# Patient Record
Sex: FEMALE
[De-identification: names, ages, dates, MRNs, and addresses within clinical notes are randomized per-mention and may not be internally consistent; named-entity substitution may affect disease eponyms.]

---

## 2024-06-28 PROBLEM — Z00.00 ENCOUNTER FOR PREVENTIVE HEALTH EXAMINATION: Status: ACTIVE | Noted: 2024-06-28

## 2024-06-29 ENCOUNTER — APPOINTMENT (OUTPATIENT)
Dept: ANTEPARTUM | Facility: CLINIC | Age: 34
End: 2024-06-29
Payer: MEDICAID

## 2024-10-16 ENCOUNTER — EMERGENCY (EMERGENCY)
Facility: HOSPITAL | Age: 34
LOS: 1 days | Discharge: HOME | End: 2024-10-16
Attending: STUDENT IN AN ORGANIZED HEALTH CARE EDUCATION/TRAINING PROGRAM | Admitting: STUDENT IN AN ORGANIZED HEALTH CARE EDUCATION/TRAINING PROGRAM
Payer: MEDICAID

## 2024-10-16 VITALS
HEIGHT: 59 IN | TEMPERATURE: 99 F | WEIGHT: 134.92 LBS | DIASTOLIC BLOOD PRESSURE: 74 MMHG | RESPIRATION RATE: 16 BRPM | HEART RATE: 125 BPM | SYSTOLIC BLOOD PRESSURE: 116 MMHG | OXYGEN SATURATION: 98 %

## 2024-10-16 VITALS
OXYGEN SATURATION: 98 % | RESPIRATION RATE: 18 BRPM | HEART RATE: 111 BPM | SYSTOLIC BLOOD PRESSURE: 100 MMHG | DIASTOLIC BLOOD PRESSURE: 60 MMHG | TEMPERATURE: 98 F

## 2024-10-16 LAB
ALBUMIN SERPL ELPH-MCNC: 2.8 G/DL — LOW (ref 3.3–5)
ALP SERPL-CCNC: 179 U/L — HIGH (ref 40–120)
ALT FLD-CCNC: 18 U/L — SIGNIFICANT CHANGE UP (ref 12–78)
ANION GAP SERPL CALC-SCNC: 13 MMOL/L — SIGNIFICANT CHANGE UP (ref 5–17)
APPEARANCE UR: ABNORMAL
AST SERPL-CCNC: 19 U/L — SIGNIFICANT CHANGE UP (ref 15–37)
BACTERIA # UR AUTO: ABNORMAL /HPF
BASOPHILS # BLD AUTO: 0.02 K/UL — SIGNIFICANT CHANGE UP (ref 0–0.2)
BASOPHILS NFR BLD AUTO: 0.2 % — SIGNIFICANT CHANGE UP (ref 0–2)
BILIRUB SERPL-MCNC: 0.2 MG/DL — SIGNIFICANT CHANGE UP (ref 0.2–1.2)
BILIRUB UR-MCNC: NEGATIVE — SIGNIFICANT CHANGE UP
BUN SERPL-MCNC: 7 MG/DL — SIGNIFICANT CHANGE UP (ref 7–23)
CALCIUM SERPL-MCNC: 9.1 MG/DL — SIGNIFICANT CHANGE UP (ref 8.5–10.1)
CHLORIDE SERPL-SCNC: 105 MMOL/L — SIGNIFICANT CHANGE UP (ref 96–108)
CO2 SERPL-SCNC: 19 MMOL/L — LOW (ref 22–31)
COLOR SPEC: YELLOW — SIGNIFICANT CHANGE UP
CREAT SERPL-MCNC: 0.53 MG/DL — SIGNIFICANT CHANGE UP (ref 0.5–1.3)
DIFF PNL FLD: NEGATIVE — SIGNIFICANT CHANGE UP
EGFR: 125 ML/MIN/1.73M2 — SIGNIFICANT CHANGE UP
EOSINOPHIL # BLD AUTO: 0.04 K/UL — SIGNIFICANT CHANGE UP (ref 0–0.5)
EOSINOPHIL NFR BLD AUTO: 0.4 % — SIGNIFICANT CHANGE UP (ref 0–6)
EPI CELLS # UR: PRESENT
GLUCOSE SERPL-MCNC: 93 MG/DL — SIGNIFICANT CHANGE UP (ref 70–99)
GLUCOSE UR QL: NEGATIVE MG/DL — SIGNIFICANT CHANGE UP
HCG SERPL-ACNC: HIGH MIU/ML
HCT VFR BLD CALC: 33.5 % — LOW (ref 34.5–45)
HGB BLD-MCNC: 11.4 G/DL — LOW (ref 11.5–15.5)
IMM GRANULOCYTES NFR BLD AUTO: 1 % — HIGH (ref 0–0.9)
KETONES UR-MCNC: NEGATIVE MG/DL — SIGNIFICANT CHANGE UP
LEUKOCYTE ESTERASE UR-ACNC: NEGATIVE — SIGNIFICANT CHANGE UP
LYMPHOCYTES # BLD AUTO: 0.68 K/UL — LOW (ref 1–3.3)
LYMPHOCYTES # BLD AUTO: 7 % — LOW (ref 13–44)
MCHC RBC-ENTMCNC: 27.1 PG — SIGNIFICANT CHANGE UP (ref 27–34)
MCHC RBC-ENTMCNC: 34 GM/DL — SIGNIFICANT CHANGE UP (ref 32–36)
MCV RBC AUTO: 79.8 FL — LOW (ref 80–100)
MONOCYTES # BLD AUTO: 0.92 K/UL — HIGH (ref 0–0.9)
MONOCYTES NFR BLD AUTO: 9.5 % — SIGNIFICANT CHANGE UP (ref 2–14)
NEUTROPHILS # BLD AUTO: 7.89 K/UL — HIGH (ref 1.8–7.4)
NEUTROPHILS NFR BLD AUTO: 81.9 % — HIGH (ref 43–77)
NITRITE UR-MCNC: NEGATIVE — SIGNIFICANT CHANGE UP
NRBC # BLD: 0 /100 WBCS — SIGNIFICANT CHANGE UP (ref 0–0)
PH UR: 7 — SIGNIFICANT CHANGE UP (ref 5–8)
PLATELET # BLD AUTO: 248 K/UL — SIGNIFICANT CHANGE UP (ref 150–400)
POTASSIUM SERPL-MCNC: 3.9 MMOL/L — SIGNIFICANT CHANGE UP (ref 3.5–5.3)
POTASSIUM SERPL-SCNC: 3.9 MMOL/L — SIGNIFICANT CHANGE UP (ref 3.5–5.3)
PROT SERPL-MCNC: 7.1 G/DL — SIGNIFICANT CHANGE UP (ref 6–8.3)
PROT UR-MCNC: NEGATIVE MG/DL — SIGNIFICANT CHANGE UP
RBC # BLD: 4.2 M/UL — SIGNIFICANT CHANGE UP (ref 3.8–5.2)
RBC # FLD: 15.3 % — HIGH (ref 10.3–14.5)
RBC CASTS # UR COMP ASSIST: 0 /HPF — SIGNIFICANT CHANGE UP (ref 0–4)
SODIUM SERPL-SCNC: 137 MMOL/L — SIGNIFICANT CHANGE UP (ref 135–145)
SP GR SPEC: 1.01 — SIGNIFICANT CHANGE UP (ref 1–1.03)
UROBILINOGEN FLD QL: 1 MG/DL — SIGNIFICANT CHANGE UP (ref 0.2–1)
WBC # BLD: 9.65 K/UL — SIGNIFICANT CHANGE UP (ref 3.8–10.5)
WBC # FLD AUTO: 9.65 K/UL — SIGNIFICANT CHANGE UP (ref 3.8–10.5)
WBC UR QL: SIGNIFICANT CHANGE UP /HPF (ref 0–5)

## 2024-10-16 PROCEDURE — 81001 URINALYSIS AUTO W/SCOPE: CPT

## 2024-10-16 PROCEDURE — 85025 COMPLETE CBC W/AUTO DIFF WBC: CPT

## 2024-10-16 PROCEDURE — 84702 CHORIONIC GONADOTROPIN TEST: CPT

## 2024-10-16 PROCEDURE — 36415 COLL VENOUS BLD VENIPUNCTURE: CPT

## 2024-10-16 PROCEDURE — 80053 COMPREHEN METABOLIC PANEL: CPT

## 2024-10-16 PROCEDURE — 99284 EMERGENCY DEPT VISIT MOD MDM: CPT | Mod: 25

## 2024-10-16 RX ORDER — SODIUM CHLORIDE 0.9 % (FLUSH) 0.9 %
1000 SYRINGE (ML) INJECTION ONCE
Refills: 0 | Status: COMPLETED | OUTPATIENT
Start: 2024-10-16 | End: 2024-10-16

## 2024-10-16 RX ORDER — ACETAMINOPHEN 325 MG
650 TABLET ORAL ONCE
Refills: 0 | Status: COMPLETED | OUTPATIENT
Start: 2024-10-16 | End: 2024-10-16

## 2024-10-16 RX ADMIN — Medication 650 MILLIGRAM(S): at 04:40

## 2024-10-16 RX ADMIN — Medication 1000 MILLILITER(S): at 04:40

## 2024-10-16 NOTE — ED ADULT TRIAGE NOTE - CCCP TRG CHIEF CMPLNT
No care due was identified.  [facility] Embedded Care Due Messages. Reference number: [id number].   1/24/2024 1:57:13 PM CST   fever LMP 2/3/24/fever

## 2024-10-16 NOTE — ED PROVIDER NOTE - OBJECTIVE STATEMENT
34-year-old female G3, P2 currently 36 weeks pregnant, LMP 2/3/24, presents with fever that started last night.  Patient went to her OB/GYN yesterday for a routine checkup.  She had a normal ultrasound and a normal urinalysis.  She received a vaccination in the office yesterday for flu, COVID, and RSV.  It was a total of 4 injections, 2 in each arm.  At 11 PM last night she started to feel chills and at 1:40am, she had a temperature of 101.1.  She took no medication for the fever prior to arrival.  She denies any associated abdominal pain, vaginal bleeding, back pain, dysuria, hematuria, cough, chest pain, shortness of breath. Ob/ Gyn Dr. Swain 34-year-old female  currently 36 weeks pregnant, LMP 2/3/24, presents with fever that started last night.  Patient went to her OB/GYN yesterday for a routine checkup.  She had a normal ultrasound and a normal urinalysis.  She received a vaccination in the office yesterday for flu, COVID, and RSV.  It was a total of 4 injections, 2 in each arm.  At 11 PM last night she started to feel chills and at 1:40am, she had a temperature of 101.1.  She took no medication for the fever prior to arrival.  She denies any associated abdominal pain, vaginal bleeding, back pain, dysuria, hematuria, cough, chest pain, shortness of breath. Ob/ Gyn Dr. Swain

## 2024-10-16 NOTE — ED PROVIDER NOTE - NSFOLLOWUPINSTRUCTIONS_ED_ALL_ED_FT
Please take Tylenol for fever and follow up with your Ob/Gyn.  Return to the ER for persistent fever, abdominal pain, vaginal bleeding, back pain, burning urination, cough, chest pain, shortness of breath, or any other concerns.     Fever, Adult        A fever is an increase in the body's temperature. It is usually defined as a temperature of 100.4°F (38°C) or higher. Brief mild or moderate fevers generally have no long-term effects, and they often do not need treatment. Moderate or high fevers may make you feel uncomfortable and can sometimes be a sign of a serious illness or disease. The sweating that may occur with repeated or prolonged fever may also cause a loss of fluid in the body (dehydration).    Fever is confirmed by taking a temperature with a thermometer. A measured temperature can vary with:    Age.  Time of day.  Where in the body you take the temperature. Readings may vary if you place the thermometer:    In the mouth (oral).  In the rectum (rectal).  In the ear (tympanic).  Under the arm (axillary).  On the forehead (temporal).    Follow these instructions at home:      Medicines    Take over-the counter and prescription medicines only as told by your health care provider. Follow the dosing instructions carefully.  If you were prescribed an antibiotic medicine, take it as told by your health care provider. Do not stop taking the antibiotic even if you start to feel better.        General instructions    Watch your condition for any changes. Let your health care provider know about them.  Rest as needed.  Drink enough fluid to keep your urine pale yellow. This helps to prevent dehydration.  Sponge yourself or bathe with room-temperature water to help reduce your body temperature as needed. Do not use ice water.  Do not use too many blankets or wear clothes that are too heavy.  If your fever may be caused by an infection that spreads from person to person (is contagious), such as a cold or the flu, you should stay home from work and public gatherings for at least 24 hours after your fever is gone. Your fever should be gone without the need to use medicines.    Contact a health care provider if:  You vomit.  You cannot eat or drink without vomiting.  You have diarrhea.  You have pain when you urinate.  Your symptoms do not improve with treatment.  You develop new symptoms.  You develop excessive weakness.    Get help right away if:  You have shortness of breath or have trouble breathing.  You are dizzy or you faint.  You are disoriented or confused.  You develop signs of dehydration, such as:    Dark urine, very little urine, or no urine.  Cracked lips.  Dry mouth.  Sunken eyes.  Sleepiness.  Weakness.  You develop severe pain in your abdomen.  You have persistent vomiting or diarrhea.  You develop a skin rash.  Your symptoms suddenly get worse.    Summary  A fever is an increase in the body's temperature. It is usually defined as a temperature of 100.4°F (38°C) or higher. Moderate or high fevers can sometimes be a sign of a serious illness or disease. The sweating that may occur with repeated or prolonged fever may also cause dehydration.  Pay attention to any changes in your symptoms and contact your health care provider if your symptoms do not improve with treatment.  Take over-the counter and prescription medicines only as told by your health care provider. Follow the dosing instructions carefully.  If your fever is from an infection that may be contagious, such as cold or flu, you should stay home from work and public gatherings for at least 24 hours after your fever is gone. Your fever should be gone without the need to use medicines.  Get help right away if you develop signs of dehydration, such as dark urine, cracked lips, dry mouth, sunken eyes, sleepiness, or weakness.    ADDITIONAL NOTES AND INSTRUCTIONS    Please follow up with your Primary MD in 24-48 hr.  Seek immediate medical care for any new/worsening signs or symptoms.

## 2024-10-16 NOTE — ED ADULT NURSE NOTE - OBJECTIVE STATEMENT
pt a&ox4 with complaints of fever since 11pm last night after having flu/covid/rsv vaccines yesterday 36 wk 3d pregnant. has low grade temp 99.4 with body aches and soreness to upper arms where she had shots, worse right arm. denies any abdominal cramping/ vag bleeding

## 2024-10-16 NOTE — ED PROVIDER NOTE - NSICDXPASTSURGICALHX_GEN_ALL_CORE_FT
PAST SURGICAL HISTORY:  No significant past surgical history Prednisone Pregnancy And Lactation Text: This medication is Pregnancy Category C and it isn't know if it is safe during pregnancy. This medication is excreted in breast milk.

## 2024-10-16 NOTE — ED PROVIDER NOTE - CARE PROVIDERS DIRECT ADDRESSES
,DirectAddress_Unknown,[email or web address]

## 2024-10-16 NOTE — ED PROVIDER NOTE - PATIENT PORTAL LINK FT
You can access the FollowMyHealth Patient Portal offered by [facility] by registering at the following website: [email or web address]. By joining [employer]’s FollowMyHealth portal, you will also be able to view your health information using other applications (apps) compatible with our system.

## 2024-10-16 NOTE — ED PROVIDER NOTE - CARE PROVIDER_API CALL
Dr. Swain,   Phone: (   )    -  Fax: (   )    -  Follow Up Time:     Cruz Dawn  Obstetrics and Gynecology  [address] [place] [id number]  Phone: [phone]  Fax: [phone]  Follow Up Time:

## 2024-10-16 NOTE — ED PROVIDER NOTE - DIFFERENTIAL DIAGNOSIS
Differential Diagnosis Ddx includes but not limited to post-vaccination fever, UTI, PNA, viral illness

## 2024-10-16 NOTE — ED PROVIDER NOTE - PROGRESS NOTE DETAILS
HR improved to 108, afebrile.  Results of work up discussed, copies of all reports given.  Patient will f/u with Ob/Gyn.  Advised Tylenol.  Return precautions discussed.  Patient expressed understanding of discharge instructions. HR improved to 108, afebrile.  Results of work up discussed, copies of all reports given.  Patient offered fetal US but she is declining since she had normal US yesterday. Patient will f/u with Ob/Gyn.  Advised Tylenol.  Return precautions discussed.  Patient expressed understanding of discharge instructions.

## 2024-10-16 NOTE — ED PROVIDER NOTE - CLINICAL SUMMARY MEDICAL DECISION MAKING FREE TEXT BOX
34 year old female  currently 36 weeks pregnant presents with fever that started last night.  Yesterday she had a routine check up with her Ob/Gyn. She had a normal UA and US.  She received a vaccination for flu/covid/rsv- 4 injections total.  Her temp was 101.1 early this morning, no medication taken PTA.  She denies abdominal pain, dysuria, hematuria, vaginal bleeding, cough.  Low-grade temp with  in ED. Check labs, UA, Ucx, hydrate, Tylenol, reassess

## 2024-10-25 PROBLEM — Z78.9 OTHER SPECIFIED HEALTH STATUS: Chronic | Status: ACTIVE | Noted: 2024-10-16

## 2024-10-30 ENCOUNTER — OUTPATIENT (OUTPATIENT)
Dept: OUTPATIENT SERVICES | Facility: HOSPITAL | Age: 34
LOS: 1 days | End: 2024-10-30

## 2024-10-30 VITALS
HEIGHT: 58.5 IN | OXYGEN SATURATION: 98 % | TEMPERATURE: 98 F | DIASTOLIC BLOOD PRESSURE: 75 MMHG | WEIGHT: 130.95 LBS | SYSTOLIC BLOOD PRESSURE: 112 MMHG | HEART RATE: 97 BPM | RESPIRATION RATE: 16 BRPM

## 2024-10-30 DIAGNOSIS — Z98.891 HISTORY OF UTERINE SCAR FROM PREVIOUS SURGERY: Chronic | ICD-10-CM

## 2024-10-30 DIAGNOSIS — Z98.891 HISTORY OF UTERINE SCAR FROM PREVIOUS SURGERY: ICD-10-CM

## 2024-10-30 DIAGNOSIS — Z98.890 OTHER SPECIFIED POSTPROCEDURAL STATES: Chronic | ICD-10-CM

## 2024-10-30 PROBLEM — Z78.9 OTHER SPECIFIED HEALTH STATUS: Chronic | Status: INACTIVE | Noted: 2024-10-16 | Resolved: 2024-10-30

## 2024-10-30 LAB
ALBUMIN SERPL ELPH-MCNC: 3.6 G/DL — SIGNIFICANT CHANGE UP (ref 3.3–5)
ALP SERPL-CCNC: 194 U/L — HIGH (ref 40–120)
ALT FLD-CCNC: 11 U/L — SIGNIFICANT CHANGE UP (ref 10–45)
ANION GAP SERPL CALC-SCNC: 17 MMOL/L — SIGNIFICANT CHANGE UP (ref 5–17)
APPEARANCE UR: CLEAR — SIGNIFICANT CHANGE UP
AST SERPL-CCNC: 13 U/L — SIGNIFICANT CHANGE UP (ref 10–40)
BASOPHILS # BLD AUTO: 0.02 K/UL — SIGNIFICANT CHANGE UP (ref 0–0.2)
BASOPHILS NFR BLD AUTO: 0.3 % — SIGNIFICANT CHANGE UP (ref 0–2)
BILIRUB SERPL-MCNC: <0.2 MG/DL — SIGNIFICANT CHANGE UP (ref 0.2–1.2)
BILIRUB UR-MCNC: NEGATIVE — SIGNIFICANT CHANGE UP
BLD GP AB SCN SERPL QL: NEGATIVE — SIGNIFICANT CHANGE UP
BUN SERPL-MCNC: 10 MG/DL — SIGNIFICANT CHANGE UP (ref 7–23)
CALCIUM SERPL-MCNC: 9.2 MG/DL — SIGNIFICANT CHANGE UP (ref 8.4–10.5)
CHLORIDE SERPL-SCNC: 102 MMOL/L — SIGNIFICANT CHANGE UP (ref 96–108)
CO2 SERPL-SCNC: 17 MMOL/L — LOW (ref 22–31)
COLOR SPEC: YELLOW — SIGNIFICANT CHANGE UP
CREAT SERPL-MCNC: 0.53 MG/DL — SIGNIFICANT CHANGE UP (ref 0.5–1.3)
CRP SERPL-MCNC: 5 MG/L — HIGH
DIFF PNL FLD: NEGATIVE — SIGNIFICANT CHANGE UP
EGFR: 125 ML/MIN/1.73M2 — SIGNIFICANT CHANGE UP
EOSINOPHIL # BLD AUTO: 0.06 K/UL — SIGNIFICANT CHANGE UP (ref 0–0.5)
EOSINOPHIL NFR BLD AUTO: 0.8 % — SIGNIFICANT CHANGE UP (ref 0–6)
FERRITIN SERPL-MCNC: 23 NG/ML — SIGNIFICANT CHANGE UP (ref 15–150)
FOLATE SERPL-MCNC: >20 NG/ML — SIGNIFICANT CHANGE UP
GLUCOSE SERPL-MCNC: 80 MG/DL — SIGNIFICANT CHANGE UP (ref 70–99)
GLUCOSE UR QL: NEGATIVE MG/DL — SIGNIFICANT CHANGE UP
HCT VFR BLD CALC: 33.8 % — LOW (ref 34.5–45)
HGB BLD-MCNC: 11.3 G/DL — LOW (ref 11.5–15.5)
IMM GRANULOCYTES NFR BLD AUTO: 0.5 % — SIGNIFICANT CHANGE UP (ref 0–0.9)
IRON SATN MFR SERPL: 16 % — SIGNIFICANT CHANGE UP (ref 14–50)
IRON SATN MFR SERPL: 79 UG/DL — SIGNIFICANT CHANGE UP (ref 30–160)
KETONES UR-MCNC: NEGATIVE MG/DL — SIGNIFICANT CHANGE UP
LEUKOCYTE ESTERASE UR-ACNC: NEGATIVE — SIGNIFICANT CHANGE UP
LYMPHOCYTES # BLD AUTO: 1.77 K/UL — SIGNIFICANT CHANGE UP (ref 1–3.3)
LYMPHOCYTES # BLD AUTO: 23.9 % — SIGNIFICANT CHANGE UP (ref 13–44)
MCHC RBC-ENTMCNC: 27 PG — SIGNIFICANT CHANGE UP (ref 27–34)
MCHC RBC-ENTMCNC: 33.4 G/DL — SIGNIFICANT CHANGE UP (ref 32–36)
MCV RBC AUTO: 80.9 FL — SIGNIFICANT CHANGE UP (ref 80–100)
MONOCYTES # BLD AUTO: 0.69 K/UL — SIGNIFICANT CHANGE UP (ref 0–0.9)
MONOCYTES NFR BLD AUTO: 9.3 % — SIGNIFICANT CHANGE UP (ref 2–14)
NEUTROPHILS # BLD AUTO: 4.84 K/UL — SIGNIFICANT CHANGE UP (ref 1.8–7.4)
NEUTROPHILS NFR BLD AUTO: 65.2 % — SIGNIFICANT CHANGE UP (ref 43–77)
NITRITE UR-MCNC: NEGATIVE — SIGNIFICANT CHANGE UP
PH UR: 7 — SIGNIFICANT CHANGE UP (ref 5–8)
PLATELET # BLD AUTO: 277 K/UL — SIGNIFICANT CHANGE UP (ref 150–400)
POTASSIUM SERPL-MCNC: 4.3 MMOL/L — SIGNIFICANT CHANGE UP (ref 3.5–5.3)
POTASSIUM SERPL-SCNC: 4.3 MMOL/L — SIGNIFICANT CHANGE UP (ref 3.5–5.3)
PROT SERPL-MCNC: 6.3 G/DL — SIGNIFICANT CHANGE UP (ref 6–8.3)
PROT UR-MCNC: NEGATIVE MG/DL — SIGNIFICANT CHANGE UP
RBC # BLD: 4.18 M/UL — SIGNIFICANT CHANGE UP (ref 3.8–5.2)
RBC # FLD: 16 % — HIGH (ref 10.3–14.5)
RETICS #: 102.7 K/UL — SIGNIFICANT CHANGE UP (ref 25–125)
RETICS/RBC NFR: 2.5 % — SIGNIFICANT CHANGE UP (ref 0.5–2.5)
RH IG SCN BLD-IMP: POSITIVE — SIGNIFICANT CHANGE UP
SODIUM SERPL-SCNC: 136 MMOL/L — SIGNIFICANT CHANGE UP (ref 135–145)
SP GR SPEC: 1.01 — SIGNIFICANT CHANGE UP (ref 1–1.03)
TIBC SERPL-MCNC: 500 UG/DL — HIGH (ref 220–430)
UIBC SERPL-MCNC: 422 UG/DL — HIGH (ref 110–370)
UROBILINOGEN FLD QL: 0.2 MG/DL — SIGNIFICANT CHANGE UP (ref 0.2–1)
VIT B12 SERPL-MCNC: 303 PG/ML — SIGNIFICANT CHANGE UP (ref 232–1245)
WBC # BLD: 7.42 K/UL — SIGNIFICANT CHANGE UP (ref 3.8–10.5)
WBC # FLD AUTO: 7.42 K/UL — SIGNIFICANT CHANGE UP (ref 3.8–10.5)

## 2024-10-30 NOTE — OB PST NOTE - PROBLEM SELECTOR PLAN 1
Pt. is scheduled for a repeat  section 24.  Pt. verbalized understanding of instructions and that Chlorhexidine is for external use.  PST CBC, UA, and T&S drawn.

## 2024-10-30 NOTE — OB PST NOTE - TEACHING/LEARNING LEARNING PREFERENCES
Blood count is just a little above normal but does not indicate infection.   NO anemia  Liver, kidneys and sugar are good written material

## 2024-11-05 ENCOUNTER — TRANSCRIPTION ENCOUNTER (OUTPATIENT)
Age: 34
End: 2024-11-05

## 2024-11-05 ENCOUNTER — ASOB RESULT (OUTPATIENT)
Age: 34
End: 2024-11-05

## 2024-11-05 ENCOUNTER — INPATIENT (INPATIENT)
Facility: HOSPITAL | Age: 34
LOS: 1 days | Discharge: HOME | End: 2024-11-07
Attending: OBSTETRICS & GYNECOLOGY | Admitting: OBSTETRICS & GYNECOLOGY

## 2024-11-05 ENCOUNTER — APPOINTMENT (OUTPATIENT)
Dept: ANTEPARTUM | Facility: CLINIC | Age: 34
End: 2024-11-05
Payer: MEDICAID

## 2024-11-05 VITALS
HEART RATE: 86 BPM | SYSTOLIC BLOOD PRESSURE: 127 MMHG | RESPIRATION RATE: 15 BRPM | TEMPERATURE: 99 F | DIASTOLIC BLOOD PRESSURE: 74 MMHG

## 2024-11-05 DIAGNOSIS — O26.899 OTHER SPECIFIED PREGNANCY RELATED CONDITIONS, UNSPECIFIED TRIMESTER: ICD-10-CM

## 2024-11-05 DIAGNOSIS — Z98.891 HISTORY OF UTERINE SCAR FROM PREVIOUS SURGERY: Chronic | ICD-10-CM

## 2024-11-05 DIAGNOSIS — Z98.890 OTHER SPECIFIED POSTPROCEDURAL STATES: Chronic | ICD-10-CM

## 2024-11-05 LAB
BASOPHILS # BLD AUTO: 0.01 K/UL — SIGNIFICANT CHANGE UP (ref 0–0.2)
BASOPHILS NFR BLD AUTO: 0.1 % — SIGNIFICANT CHANGE UP (ref 0–2)
BLD GP AB SCN SERPL QL: NEGATIVE — SIGNIFICANT CHANGE UP
EOSINOPHIL # BLD AUTO: 0.05 K/UL — SIGNIFICANT CHANGE UP (ref 0–0.5)
EOSINOPHIL NFR BLD AUTO: 0.6 % — SIGNIFICANT CHANGE UP (ref 0–6)
HCT VFR BLD CALC: 35.9 % — SIGNIFICANT CHANGE UP (ref 34.5–45)
HGB BLD-MCNC: 11.9 G/DL — SIGNIFICANT CHANGE UP (ref 11.5–15.5)
IANC: 4.74 K/UL — SIGNIFICANT CHANGE UP (ref 1.8–7.4)
IMM GRANULOCYTES NFR BLD AUTO: 0.5 % — SIGNIFICANT CHANGE UP (ref 0–0.9)
LYMPHOCYTES # BLD AUTO: 2.4 K/UL — SIGNIFICANT CHANGE UP (ref 1–3.3)
LYMPHOCYTES # BLD AUTO: 30.5 % — SIGNIFICANT CHANGE UP (ref 13–44)
MCHC RBC-ENTMCNC: 27 PG — SIGNIFICANT CHANGE UP (ref 27–34)
MCHC RBC-ENTMCNC: 33.1 G/DL — SIGNIFICANT CHANGE UP (ref 32–36)
MCV RBC AUTO: 81.4 FL — SIGNIFICANT CHANGE UP (ref 80–100)
MONOCYTES # BLD AUTO: 0.62 K/UL — SIGNIFICANT CHANGE UP (ref 0–0.9)
MONOCYTES NFR BLD AUTO: 7.9 % — SIGNIFICANT CHANGE UP (ref 2–14)
NEUTROPHILS # BLD AUTO: 4.74 K/UL — SIGNIFICANT CHANGE UP (ref 1.8–7.4)
NEUTROPHILS NFR BLD AUTO: 60.4 % — SIGNIFICANT CHANGE UP (ref 43–77)
NRBC # BLD: 0 /100 WBCS — SIGNIFICANT CHANGE UP (ref 0–0)
NRBC # FLD: 0 K/UL — SIGNIFICANT CHANGE UP (ref 0–0)
PLATELET # BLD AUTO: 272 K/UL — SIGNIFICANT CHANGE UP (ref 150–400)
RBC # BLD: 4.41 M/UL — SIGNIFICANT CHANGE UP (ref 3.8–5.2)
RBC # FLD: 15.6 % — HIGH (ref 10.3–14.5)
RH IG SCN BLD-IMP: POSITIVE — SIGNIFICANT CHANGE UP
WBC # BLD: 7.86 K/UL — SIGNIFICANT CHANGE UP (ref 3.8–10.5)
WBC # FLD AUTO: 7.86 K/UL — SIGNIFICANT CHANGE UP (ref 3.8–10.5)

## 2024-11-05 PROCEDURE — 76815 OB US LIMITED FETUS(S): CPT | Mod: 26

## 2024-11-05 DEVICE — SURGICEL SNOW 2 X 4": Type: IMPLANTABLE DEVICE | Status: FUNCTIONAL

## 2024-11-05 RX ORDER — SODIUM CHLORIDE 9 MG/ML
3 INJECTION, SOLUTION INTRAMUSCULAR; INTRAVENOUS; SUBCUTANEOUS EVERY 8 HOURS
Refills: 0 | Status: DISCONTINUED | OUTPATIENT
Start: 2024-11-05 | End: 2024-11-07

## 2024-11-05 RX ORDER — CHLORHEXIDINE GLUCONATE 40 MG/ML
1 SOLUTION TOPICAL DAILY
Refills: 0 | Status: DISCONTINUED | OUTPATIENT
Start: 2024-11-05 | End: 2024-11-05

## 2024-11-05 RX ORDER — CITRIC ACID/SODIUM CITRATE 334-500MG
30 SOLUTION, ORAL ORAL ONCE
Refills: 0 | Status: COMPLETED | OUTPATIENT
Start: 2024-11-05 | End: 2024-11-05

## 2024-11-05 RX ORDER — OXYCODONE HYDROCHLORIDE 30 MG/1
5 TABLET ORAL ONCE
Refills: 0 | Status: DISCONTINUED | OUTPATIENT
Start: 2024-11-05 | End: 2024-11-07

## 2024-11-05 RX ORDER — ACETAMINOPHEN 500 MG
975 TABLET ORAL
Refills: 0 | Status: DISCONTINUED | OUTPATIENT
Start: 2024-11-05 | End: 2024-11-07

## 2024-11-05 RX ORDER — FAMOTIDINE 10 MG/ML
20 INJECTION INTRAVENOUS ONCE
Refills: 0 | Status: COMPLETED | OUTPATIENT
Start: 2024-11-05 | End: 2024-11-05

## 2024-11-05 RX ORDER — OXYTOCIN IN D5W-0.2% SODIUM CL 15/250 ML
42 PLASTIC BAG, INJECTION (ML) INTRAVENOUS
Qty: 30 | Refills: 0 | Status: DISCONTINUED | OUTPATIENT
Start: 2024-11-05 | End: 2024-11-06

## 2024-11-05 RX ORDER — SIMETHICONE 80 MG/1
80 TABLET, CHEWABLE ORAL EVERY 4 HOURS
Refills: 0 | Status: DISCONTINUED | OUTPATIENT
Start: 2024-11-05 | End: 2024-11-07

## 2024-11-05 RX ORDER — IBUPROFEN 200 MG
600 TABLET ORAL EVERY 6 HOURS
Refills: 0 | Status: COMPLETED | OUTPATIENT
Start: 2024-11-05 | End: 2025-10-04

## 2024-11-05 RX ORDER — KETOROLAC TROMETHAMINE 30 MG/ML
30 INJECTION INTRAMUSCULAR; INTRAVENOUS EVERY 6 HOURS
Refills: 0 | Status: DISCONTINUED | OUTPATIENT
Start: 2024-11-05 | End: 2024-11-06

## 2024-11-05 RX ORDER — HEPARIN SODIUM 10000 [USP'U]/ML
5000 INJECTION INTRAVENOUS; SUBCUTANEOUS EVERY 12 HOURS
Refills: 0 | Status: DISCONTINUED | OUTPATIENT
Start: 2024-11-05 | End: 2024-11-07

## 2024-11-05 RX ORDER — MODIFIED LANOLIN
1 OINTMENT (GRAM) TOPICAL EVERY 6 HOURS
Refills: 0 | Status: DISCONTINUED | OUTPATIENT
Start: 2024-11-05 | End: 2024-11-07

## 2024-11-05 RX ORDER — DIPHENHYDRAMINE HCL 12.5MG/5ML
25 ELIXIR ORAL EVERY 6 HOURS
Refills: 0 | Status: DISCONTINUED | OUTPATIENT
Start: 2024-11-05 | End: 2024-11-07

## 2024-11-05 RX ORDER — ACETAMINOPHEN 500 MG
1000 TABLET ORAL ONCE
Refills: 0 | Status: COMPLETED | OUTPATIENT
Start: 2024-11-05 | End: 2024-11-05

## 2024-11-05 RX ORDER — OXYCODONE HYDROCHLORIDE 30 MG/1
5 TABLET ORAL
Refills: 0 | Status: DISCONTINUED | OUTPATIENT
Start: 2024-11-05 | End: 2024-11-07

## 2024-11-05 RX ORDER — CLOSTRIDIUM TETANI TOXOID ANTIGEN (FORMALDEHYDE INACTIVATED), CORYNEBACTERIUM DIPHTHERIAE TOXOID ANTIGEN (FORMALDEHYDE INACTIVATED), BORDETELLA PERTUSSIS TOXOID ANTIGEN (GLUTARALDEHYDE INACTIVATED), BORDETELLA PERTUSSIS FILAMENTOUS HEMAGGLUTININ ANTIGEN (FORMALDEHYDE INACTIVATED), BORDETELLA PERTUSSIS PERTACTIN ANTIGEN, AND BORDETELLA PERTUSSIS FIMBRIAE 2/3 ANTIGEN 5; 2; 2.5; 5; 3; 5 [LF]/.5ML; [LF]/.5ML; UG/.5ML; UG/.5ML; UG/.5ML; UG/.5ML
0.5 INJECTION, SUSPENSION INTRAMUSCULAR ONCE
Refills: 0 | Status: DISCONTINUED | OUTPATIENT
Start: 2024-11-05 | End: 2024-11-07

## 2024-11-05 RX ORDER — MAGNESIUM HYDROXIDE 1200 MG/15ML
30 SUSPENSION ORAL
Refills: 0 | Status: DISCONTINUED | OUTPATIENT
Start: 2024-11-05 | End: 2024-11-07

## 2024-11-05 RX ADMIN — Medication 1000 MILLIGRAM(S): at 20:00

## 2024-11-05 RX ADMIN — KETOROLAC TROMETHAMINE 30 MILLIGRAM(S): 30 INJECTION INTRAMUSCULAR; INTRAVENOUS at 22:00

## 2024-11-05 RX ADMIN — SIMETHICONE 80 MILLIGRAM(S): 80 TABLET, CHEWABLE ORAL at 21:32

## 2024-11-05 RX ADMIN — Medication 975 MILLIGRAM(S): at 23:00

## 2024-11-05 RX ADMIN — Medication 200 MILLILITER(S): at 12:53

## 2024-11-05 RX ADMIN — FAMOTIDINE 20 MILLIGRAM(S): 10 INJECTION INTRAVENOUS at 12:55

## 2024-11-05 RX ADMIN — Medication 975 MILLIGRAM(S): at 23:45

## 2024-11-05 RX ADMIN — CHLORHEXIDINE GLUCONATE 1 APPLICATION(S): 40 SOLUTION TOPICAL at 12:00

## 2024-11-05 RX ADMIN — SODIUM CHLORIDE 3 MILLILITER(S): 9 INJECTION, SOLUTION INTRAMUSCULAR; INTRAVENOUS; SUBCUTANEOUS at 22:05

## 2024-11-05 RX ADMIN — Medication 400 MILLIGRAM(S): at 18:06

## 2024-11-05 RX ADMIN — Medication 30 MILLILITER(S): at 13:00

## 2024-11-05 RX ADMIN — HEPARIN SODIUM 5000 UNIT(S): 10000 INJECTION INTRAVENOUS; SUBCUTANEOUS at 22:04

## 2024-11-05 RX ADMIN — KETOROLAC TROMETHAMINE 30 MILLIGRAM(S): 30 INJECTION INTRAMUSCULAR; INTRAVENOUS at 22:30

## 2024-11-05 NOTE — OB PROVIDER DELIVERY SUMMARY - NSPROVIDERDELIVERYNOTE_OBGYN_ALL_OB_FT
rMTCS     Viable female infant.  cephalic presentation. 3610g . APGARS 9/9  Grossly normal uterus, bilateral tubes, and ovaries  Dense adhesions between peritoneum, rectus muscle, bladder and lower uterine segment  Hysterotomy closed in x1 layer w/ 0 vicryl  Bladder retrofilled with 200ccs of methylene blue, serosa intact, no extravasation appreciated  Surgicel powder placed atop hysterotomy site  Fascia reapproximated w/ 0 Vicryl in a running fashion   SubQ reapproximated w/ 2-0 plain gut in a running fashion   Skin reapproximated w/ 3-0 Monocryl in a subcuticular fashion   Pt to recovery in stable condition    [id number]/1400/150    Dictation #: [id number]

## 2024-11-05 NOTE — DISCHARGE NOTE OB - FINANCIAL ASSISTANCE
[facility] provides services at a reduced cost to those who are determined to be eligible through [facility]’s financial assistance program. Information regarding [facility]’s financial assistance program can be found by going to [email or web address] or by calling [phone].

## 2024-11-05 NOTE — DISCHARGE NOTE OB - MATERIALS PROVIDED
Vaccinations/[facility] [place] Screening Program/[place]  Immunization Record/Breastfeeding Log/Breastfeeding Mother’s Support Group Information/Guide to Postpartum Care/[facility] Hearing Screen Program/Back To Sleep Handout/Shaken Baby Prevention Handout/Breastfeeding Guide and Packet/Birth Certificate Instructions/Discharge Medication Information for Patients and Families Pocket Guide/MMR Vaccination (VIS Pub Date: 2012)/Tdap Vaccination (VIS Pub Date: 2012)

## 2024-11-05 NOTE — DISCHARGE NOTE OB - PATIENT PORTAL LINK FT
You can access the FollowMyHealth Patient Portal offered by [facility] by registering at the following website: [email or web address]. By joining [employer]’s FollowMyHealth portal, you will also be able to view your health information using other applications (apps) compatible with our system.

## 2024-11-05 NOTE — OB PROVIDER H&P - HISTORY OF PRESENT ILLNESS
PNC with [facility]   33 y/o  @ 39.2 wks gestation presents from [facility] for prolonged monitoring secondary to a NRNST in the office pt reports having irregular uterine contractions and states had VE in office and was 1 cm denies any VB or LOF reports +FM denies any n/v/d denies any fever or chills ap care comp by :  scheduled repeat  2024  anemia - FeSO4 1 tab qd

## 2024-11-05 NOTE — OB NEONATOLOGY/PEDIATRICIAN DELIVERY SUMMARY - BABY A: STOOL IN DELIVERY
[facility]  PREOPERATIVE INSTRUCTIONS    Surgery Date:   12/22/2017 Friday   Surgery arrival time given by surgeon: YES - was told by office to be here at [address]  ([facility] staff will call you between 3pm - 7pm the day before surgery with your arrival time. If your surgery is on a Monday, we will call you the preceding Friday. Please call [phone] after 7pm if you did not receive your arrival time.)  1. Report  to the 2nd Floor Admitting Desk on the day of your surgery. Bring your insurance card, photo identification, and any copayment (if applicable). 2. You must have a responsible adult to drive you home and stay with you the first 24 hours after surgery if you are going home the same day of your surgery. 3. Nothing to eat or drink after midnight the night before surgery. This means NO water, gum, mints, coffee, juice, etc.    4. MEDICATIONS TO TAKE THE MORNING OF SURGERY WITH A SIP OF WATER:TAKE your amlodipine, ditropan, omeprazole. You may use your eye drops and flonase if needed. Stop your probiotic and vitamins 7 days before surgery. 5. No alcoholic beverages 24 hours before and after your surgery. 6. If you are being admitted to the hospital,please leave personal belongings/luggage in your car until you have an assigned hospital room number. ( The hospital discharge time is 12 PM NOON. Your adult  should be at the hospital prior to the noon discharge time unless otherwise instructed.)   7. STOP Aspirin and/or any non-steroidal anti-inflammatory drugs (i.e. Ibuprofen, Naproxen, Advil, Aleve) as directed by your surgeon. You may take Tylenol. Stop herbal supplements 1 week prior to  surgery. 8. If you are currently taking Plavix, Coumadin,or any other blood-thinning/ anticoagulant medication contact your surgeon for instructions. 9. Wear comfortable clothes. Wear your glasses instead of contacts. Please leave all money, jewelry and valuables at home.  No make up, particularly mascara, the day of surgery. 10.  REMOVE ALL body piercings, rings,and jewelry and leave at home. Wear your hair loose or down, no pony-tails, buns, or any metal hair clips. 11. If you shower the morning of surgery, please do not apply any lotions, powders, or deodorants afterwards. Do not shave any body area within 24 hours of your surgery. 12. Please follow all instructions to avoid any potential surgical cancellation. 13. Should your physical condition change, (i.e. fever, cold, flu, etc.) please notify your surgeon as soon as possible. 14. It is important to be on time. If a situation occurs where you may be delayed, please call:  [phone] / [phone] on the day of surgery. 15. The Preadmission Testing staff can be reached at [phone]. 16. Special instructions: Free  parking 7am-5pm. Follow diet and bowel prep instructions per Dr. Junito Madrigal. 17. The patient was contacted  in person. She  verbalize  understanding of all instructions does not  need reinforcement. no

## 2024-11-05 NOTE — DISCHARGE NOTE OB - CARE PLAN
Principal Discharge DX:	Status post repeat low transverse  section  Assessment and plan of treatment:	term pregnancy now delivered via rpt c/s  stable, uncomplicated  healthy baby girl on 24   1

## 2024-11-05 NOTE — OB RN PATIENT PROFILE - FUNCTIONAL ASSESSMENT - DAILY ACTIVITY 2.
For information on Fall & Injury Prevention, visit: [email or web address] OR  [email or web address] OR  https://www.cdc.gov/steadi/patient.html 4 = No assist / stand by assistance

## 2024-11-05 NOTE — DISCHARGE NOTE OB - HOSPITAL COURSE
Tertiary  section for viable female fetus on 24   Uncomplicated, patient tolerated well  Healthy baby girl 7lb 15oz on 24

## 2024-11-05 NOTE — DISCHARGE NOTE OB - MEDICATION SUMMARY - MEDICATIONS TO TAKE
I will START or STAY ON the medications listed below when I get home from the hospital:    ibuprofen 600 mg oral tablet  -- 1 tab(s) by mouth every 6 hours, As Needed  -- Indication: For pain    Prenatal Multivitamins with Folic Acid 1 mg oral tablet  -- 1 tab(s) by mouth once a day  -- Indication: For continued need    ferrous sulfate 325 mg (65 mg elemental iron) oral tablet  -- 1 tab(s) by mouth 3 times a day  -- Indication: For anemia   I will START or STAY ON the medications listed below when I get home from the hospital:    ibuprofen 600 mg oral tablet  -- 1 tab(s) by mouth every 6 hours as needed for  moderate pain  -- Indication: For pain    acetaminophen 325 mg oral tablet  -- 3 tab(s) by mouth every 6 hours as needed for  moderate pain  -- Indication: For pain    ferrous sulfate 325 mg (65 mg elemental iron) oral tablet  -- 1 tab(s) by mouth 3 times a day  -- Indication: For anemia    ascorbic acid 500 mg oral tablet  -- 1 tab(s) by mouth once a day  -- Indication: For nutrition

## 2024-11-05 NOTE — OB RN INTRAOPERATIVE NOTE - NSSELHIDDEN_OBGYN_ALL_OB_FT
[NS_DeliveryAttending1_OBGYN_ALL_OB_FT:[id number]],[NS_DeliveryRN_OBGYN_ALL_OB_FT:[id number]]

## 2024-11-05 NOTE — OB RN PATIENT PROFILE - SPIRITUAL CULTURAL, CURRENT SITUATION, PROFILE
Presents to ED with complaints of right upper thigh. Pt reports in January he felt he pulled a muscle. He reports the pain has been ongoing but he noted this morning the pain worsened. Pt able to ambulate from lobby to room 14 independently.  
none

## 2024-11-05 NOTE — OB RN DELIVERY SUMMARY - NSSELHIDDEN_OBGYN_ALL_OB_FT
[NS_DeliveryAttending1_OBGYN_ALL_OB_FT:[id number]],[NS_DeliveryRN_OBGYN_ALL_OB_FT:[id number]]

## 2024-11-05 NOTE — OB PROVIDER H&P - NSOBVTERISKASSESS_OBGYN_ALL_OB_FT
Subjective


Progress Note Date: 06/30/20





Patient states he is feeling much better.  Continues to complain of right hip 

pain.  No new focal symptoms.  Per nursing report, his mentation is much more 

clear.  No further hallucinations as compared to yesterday.





Objective





- Vital Signs


Vital signs: 


                                   Vital Signs











Temp  98.3 F   06/30/20 07:00


 


Pulse  67   06/30/20 07:00


 


Resp  16   06/30/20 08:00


 


BP  150/75   06/30/20 07:00


 


Pulse Ox  99   06/30/20 07:00








                                 Intake & Output











 06/29/20 06/30/20 06/30/20





 18:59 06:59 18:59


 


Intake Total 500  480


 


Output Total 400 500 


 


Balance 100 -500 480


 


Intake:   


 


  Oral 500  480


 


Output:   


 


  Urine 400 500 


 


Other:   


 


  Voiding Method Urinal Urinal Urinal





 Diaper Diaper Diaper


 


  # Bowel Movements   1














- Exam





Patient's mental status, speech and language functions are normal.  Cranial ner

ves are normal.  Muscle strength is normal in the arms.  In the lower limbs, his

right ankle dorsiflexion is 3, inversion 5-, eversion 4-.  Toe extension is also

very weak in the right foot.  Right hip not checked because of severe pain.





- Labs


CBC & Chem 7: 


                                 06/30/20 06:43





                                 06/30/20 06:43


Labs: 


                  Abnormal Lab Results - Last 24 Hours (Table)











  06/29/20 06/29/20 06/30/20 Range/Units





  17:02 20:34 06:43 


 


RBC    3.18 L  (4.30-5.90)  m/uL


 


Hgb    9.3 L  (13.0-17.5)  gm/dL


 


Hct    27.9 L  (39.0-53.0)  %


 


POC Glucose (mg/dL)  209 H  167 H   (75-99)  mg/dL


 


TSH     (0.465-4.680)  mIU/L














  06/30/20 06/30/20 06/30/20 Range/Units





  06:43 06:47 12:10 


 


RBC     (4.30-5.90)  m/uL


 


Hgb     (13.0-17.5)  gm/dL


 


Hct     (39.0-53.0)  %


 


POC Glucose (mg/dL)   105 H  142 H  (75-99)  mg/dL


 


TSH  5.110 H    (0.465-4.680)  mIU/L














Assessment and Plan


Assessment: 





* Acute delirium, almost resolved.  This was likely related to pain medications,

  Norco, tramadol that he was previously receiving.


* Gait dysfunction, likely related to right hip pathology.


* Right foot drop, possibly related to peroneal nerve compression at the fibular

  head.  Patient states that he has not been sitting with legs crossed for 

  almost a year since his right hip started hurting.  Diabetic asymmetric 

  neuropathy versus lumbar radiculopathy also in the differential, although less

  likely.  Patient has normal hip adduction and hip abduction.


* Diabetes


* Coronary artery disease, status post acute MI.


Plan: 





* Patient's delirium has mostly resolved. 


* Avoid narcotics, sedatives, hypnotics to prevent delirium. 


* Continue dual antiplatelet medication for stroke prevention.


* Carotid Doppler showed no significant stenosis.


* For patient's right foot drop, he would need EMG and nerve conductions of 

  right lower extremity as outpatient.  


* B12 357, folate 11.3 and TSH mildly abnormal 5.11, with normal free T4 1.39.


* Your medical management. VTE Present on Admission, Assessment Completed on: 05-Nov-2024 12:14

## 2024-11-05 NOTE — PROCEDURAL SAFETY CHECKLIST WITH OR WITHOUT SEDATION - NSGUIDEWIRESREMOVEDSD_GEN_ALL_CORE
What Type Of Note Output Would You Prefer (Optional)?: Bullet Format
How Severe Is Your Acne?: moderate
Is This A New Presentation, Or A Follow-Up?: Acne
done

## 2024-11-05 NOTE — OB PROVIDER TRIAGE NOTE - NSHPPHYSICALEXAM_GEN_ALL_CORE
abdomen: soft, nt on palp  T(C): 37.2 (11-05-24 @ 09:54), Max: 37.2 (11-05-24 @ 09:54)  HR: 84 (11-05-24 @ 10:23) (84 - 86)  BP: 109/65 (11-05-24 @ 10:23) (109/65 - 127/74)  RR: 15 (11-05-24 @ 09:54) (15 - 15)  SpO2: --  NST in progress abdomen: soft, nt on palp  T(C): 37.2 (11-05-24 @ 09:54), Max: 37.2 (11-05-24 @ 09:54)  HR: 84 (11-05-24 @ 10:23) (84 - 86)  BP: 109/65 (11-05-24 @ 10:23) (109/65 - 127/74)  RR: 15 (11-05-24 @ 09:54) (15 - 15)  SpO2: --  NST in progress  addendum @ 1120 :  FH baseline 140 FH variability mod +FH accels FH variable decel x's 1 @ 1110  toco: irregular uterine contractions   states her pain is 7/10 on pain scale and reports she had VE in [facility] and was 1 cm abdomen: soft, nt on palp  T(C): 37.2 (11-05-24 @ 09:54), Max: 37.2 (11-05-24 @ 09:54)  HR: 84 (11-05-24 @ 10:23) (84 - 86)  BP: 109/65 (11-05-24 @ 10:23) (109/65 - 127/74)  RR: 15 (11-05-24 @ 09:54) (15 - 15)  SpO2: --  NST in progress  addendum @ 1120 :  FH baseline 140 FH variability mod +FH accels FH variable decel x's 1 @ 1110  toco: irregular uterine contractions   states her pain is 7/10 on pain scale and reports she had VE in [facility] and was 1 cm  repeat SVE: 0.5/80/-3  sono reports in [facility]  sono images in [facility]   TAS vtx anterior placenta MVP: 3.14 FH: 136 bpm

## 2024-11-05 NOTE — CHART NOTE - NSCHARTNOTEFT_GEN_A_CORE
huddle @ 1220  DR Bia Hui in attendnance   pt for repeat  @ 39.2 wks gestation for Category 2 FHT / early labor

## 2024-11-05 NOTE — OB RN PATIENT PROFILE - FALL HARM RISK - UNIVERSAL INTERVENTIONS
Bed in lowest position, wheels locked, appropriate side rails in place/Call bell, personal items and telephone in reach/Instruct patient to call for assistance before getting out of bed or chair/Non-slip footwear when patient is out of bed/[place] to call system/Physically safe environment - no spills, clutter or unnecessary equipment/Purposeful Proactive Rounding/Room/bathroom lighting operational, light cord in reach

## 2024-11-05 NOTE — OB NEONATOLOGY/PEDIATRICIAN DELIVERY SUMMARY - NSPEDSNEONOTESA_OBGYN_ALL_OB_FT
Peds called to OR for 39.2 wk LGA female born via vacuum assisted CS to a  34 y o G3 mother. No significant maternal or prenatal history. Maternal labs include Blood Type O+ , HIV - , RPR NR , Rubella I , Hep B - , GBS -. ROM at delivery on  with clear fluids (ROM hours: 0H_M).  Baby emerged vigorous, crying, was w/d/s/s with APGARS of 8/9. Resuscitation included: 10 minutes of CPAP 5/21% for increased WOB and saturations of 80-85%, weaned to RA at 15 minutes of life with adequate saturations and appropriate WOB. Mom plans to initiate breastfeeding and formula feed, consents to Hep B vaccine. Highest maternal temp: 37.5. EOS 0.08    : 24  TOB: 1425  Weight: 3610g

## 2024-11-05 NOTE — OB RN PATIENT PROFILE - FUNCTIONAL ASSESSMENT - BASIC MOBILITY 3.
E18/18  Patient : Velma Cisneros Age: 70 year old Sex: female   MRN: [id number] Encounter Date: 2/5/2019      History     Chief Complaint   Patient presents with   • Fall   • Laceration     HPI  2/5/2019  10:22 AM Velma Cisneros is a 70 year old female legally blind, who presents to the ED via EMS for evaluation of posterior head laceration after ground level fall that occurred today. Pt was volunteering at [employer] at [facility] today and reports she sustained mechanical ground level fall after walking into a wall. Pt states she fell backwards and struck the back of her head, sustaining posterior head laceration. Pt reports some neck discomfort, EMS applied C-collar. She denies feeling weak, dizzy, or lightheaded prior to falling. Pt notes she has had urinary frequency. Pt denies back pain, leg pain, nausea, vomiting, or any other associated sx. Pt is not on anticoagulation. She is unsure of last tetanus. No other complaints or modifying factors were reported.    PCP: Colby Freire MD    No Known Allergies    Discharge Medication List as of 2/5/2019 12:20 PM      Prior to Admission Medications    Details   triamterene-hydroCHLOROthiazide (MAXZIDE-25) 37.5-25 MG per tablet Take 1 tablet by mouth daily.Eprescribe, Disp-90 tablet, R-3      levothyroxine (SYNTHROID, LEVOTHROID) 100 MCG tablet Take 1 tablet by mouth daily.Eprescribe, Disp-90 tablet, R-3      VITAMIN D3 1000 UNITS capsule TAKE 1 CAPSULE BY MOUTH EVERY DAYEprescribe, Disp-100 capsule, R-0             Discharge Medication List as of 2/5/2019 12:20 PM          Past Medical History:   Diagnosis Date   • Congenital blindness    • Hematuria     due to neurofibroma   • History of leukocytosis     11-15,000   • Hyperlipidemia    • Hypothyroidism TSH 7/15   • Iron deficiency anemia     neg colon and EGD 2008, donations   • Neurofibromatosis, type 1 (von Recklinghausen's disease) (CMS/[facility])    • Osteopenia     Fosamax 2011 - 2013, stopped due to cost   • Special  screening for malignant neoplasms, colon 09/26/2018    normal       Past Surgical History:   Procedure Laterality Date   • COLONOSCOPY  8-5-08    biopsy with negative result   • COLONOSCOPY DIAGNOSTIC  09/26/2018    Dr. Valenzuela wnl   • DEXA BONE DENSITY AXIAL SKELETON  05/06/11, 2013    osteopenic   • ESOPHAGOGASTRODUODENOSCOPY TRANSORAL FLEX W/BX SINGLE OR MULT  09/26/2018    Dr. Valenzuela   • SUBMANDIBULAR GLAND EXCISION      left, tumor   • UPPER GASTROINTESTINAL ENDOSCOPY  8/08       Family History   Problem Relation Age of Onset   • Cancer Mother         Kidney   • Heart disease Mother    • Cancer, Breast Sister 42   • Congestive Heart Failure Father        Social History     Tobacco Use   • Smoking status: Never Smoker   • Smokeless tobacco: Never Used   Substance Use Topics   • Alcohol use: Yes     Alcohol/week: 1.2 oz     Types: 2 Standard drinks or equivalent per week     Comment: nite   • Drug use: No       Review of Systems   Constitutional: Negative for appetite change, chills and fever.   HENT: Negative for congestion and rhinorrhea.    Eyes: Negative for visual disturbance.   Respiratory: Negative for cough, chest tightness and shortness of breath.    Cardiovascular: Negative for chest pain and leg swelling.   Gastrointestinal: Negative for abdominal pain, constipation, diarrhea, nausea and vomiting.   Genitourinary: Positive for urgency. Negative for dysuria.   Musculoskeletal: Positive for neck pain. Negative for arthralgias, back pain and myalgias.   Skin: Positive for wound (Posterior head laceration). Negative for rash.   Neurological: Negative for dizziness, weakness, light-headedness and headaches.   Psychiatric/Behavioral: Negative for confusion. The patient is not nervous/anxious.        Physical Exam     ED Triage Vitals [02/05/19 1018]   ED Triage Vitals Group      Temp 97.5 °F (36.4 °C)      Pulse 77      Resp 18      /75      SpO2 99 %      EtCO2 mmHg       Height 4' 9\" (1.448 m)      Weight  135 lb 6.4 oz (61.4 kg)      Weight Scale Used ED Actual       Vitals:    02/05/19 1018 02/05/19 1030 02/05/19 1131 02/05/19 1200   BP: 132/75 130/67 150/70 141/70   Pulse: 77      Resp: 18      Temp: 97.5 °F (36.4 °C)      TempSrc: Oral      SpO2: 99% 100% 99% 98%   Weight: 61.4 kg      Height: 4' 9\" (1.448 m)            Physical Exam   Constitutional: She is oriented to person, place, and time. She appears well-developed and well-nourished. She is cooperative.  Non-toxic appearance. She does not have a sickly appearance. No distress. Cervical collar in place.   HENT:   Head: Normocephalic.   Nose: No rhinorrhea.   Mouth/Throat: No trismus in the jaw. Normal dentition. No dental caries. No posterior oropharyngeal edema or posterior oropharyngeal erythema.   Eyes: Conjunctivae and lids are normal. Right eye exhibits no discharge. Left eye exhibits no discharge. Right conjunctiva is not injected. Left conjunctiva is not injected. No scleral icterus.   Neck: Spinous process tenderness present.   Cardiovascular: Normal rate, regular rhythm, S1 normal, S2 normal and normal heart sounds. Exam reveals no gallop and no friction rub.   No murmur heard.  Pulmonary/Chest: Effort normal and breath sounds normal. No accessory muscle usage or stridor. No tachypnea and no bradypnea. No respiratory distress. She has no decreased breath sounds. She has no wheezes. She has no rhonchi. She has no rales. She exhibits no tenderness.   Abdominal: Soft. Bowel sounds are normal. She exhibits no distension and no ascites. There is no tenderness. There is no rigidity, no rebound, no guarding, no CVA tenderness, no tenderness at McBurney's point and negative Zaidi's sign.   Musculoskeletal: Normal range of motion. She exhibits no edema or tenderness.   Neurological: She is alert and oriented to person, place, and time. She displays no atrophy. A cranial nerve deficit ( pt is blind in both eyes) is present. No sensory deficit. She exhibits  normal muscle tone. GCS eye subscore is 4. GCS verbal subscore is 5. GCS motor subscore is 6.   Skin: Skin is warm, dry and intact. No rash noted. She is not diaphoretic. No cyanosis or erythema. No pallor.   Generalized Multiple fibromas    Psychiatric: She has a normal mood and affect. Her speech is normal and behavior is normal. Thought content normal.   Nursing note and vitals reviewed.      ED Course     Laceration Repair  Date/Time: 2/5/2019 12:00 PM  Performed by: Shauna Dodson DO  Authorized by: Shauna Dodson DO     Consent:     Consent obtained:  Verbal    Consent given by:  Patient    Risks discussed:  Pain, infection and poor wound healing    Alternatives discussed:  No treatment  Universal protocol:     Procedure explained and questions answered to patient or proxy's satisfaction: yes      Relevant documents present and verified: yes      Test results available and properly labeled: yes      Patient identity confirmed:  Verbally with patient  Laceration details:     Location:  Scalp    Scalp location:  Occipital (right)    Length (cm):  1  Repair type:     Repair type:  Simple  Pre-procedure details:     Preparation:  Patient was prepped and draped in usual sterile fashion  Exploration:     Wound exploration: wound explored through full range of motion      Wound extent: no foreign bodies/material noted      Contaminated: no    Treatment:     Area cleansed with:  Saline    Amount of cleaning:  Standard  Skin repair:     Repair method:  Tissue adhesive  Post-procedure details:     Patient tolerance of procedure:  Tolerated well, no immediate complications        Lab Results     Results for orders placed or performed during the hospital encounter of 02/05/19   Urinalysis with Micro & Culture if Indicated   Result Value Ref Range    COLOR YELLOW YELLOW    APPEARANCE CLEAR     GLUCOSE(URINE) NEGATIVE NEGATIVE mg/dL    BILIRUBIN NEGATIVE NEGATIVE    KETONES NEGATIVE NEGATIVE mg/dL    SPECIFIC GRAVITY 1.014  1.005 - 1.030    BLOOD TRACE (A) NEGATIVE    pH 7.0 5.0 - 7.0 Units    PROTEIN(URINE) NEGATIVE NEGATIVE mg/dL    UROBILINOGEN 1.0 0.0 - 1.0 mg/dL    NITRITE NEGATIVE NEGATIVE    LEUKOCYTE ESTERASE TRACE (A) NEGATIVE    Squamous EPI'S 1 to 5 0 - 5 /hpf    RBC NONE SEEN 0 - 3 /hpf    WBC 6 to 10 0 - 5 /hpf    BACTERIA NONE SEEN NONE SEEN /hpf    Hyaline Casts NONE SEEN 0 - 5 /lpf    SPECIMEN TYPE URINE, CLEAN CATCH/MIDSTREAM        Radiology Results     Imaging Results          CT Head Brain (Final result)  Result time 02/05/19 11:48:58    Final result                 Impression:    IMPRESSION: No evidence of acute intracranial abnormality.    Suspected 11 mm meningioma in the right posterior fossa. This has become  more densely calcified but is unchanged in size when compared to the prior  study.    Mild chronic small vessel ischemic disease and age-appropriate atrophy.               Narrative:    EXAM: CT HEAD WO CONTRAST    CLINICAL HISTORY: HEADACHE, POST TRAUMA    TECHNIQUE: Helical imaging through the brain was performed without IV  contrast.    COMPARISON: January 22, 2016.    FINDINGS: There are no abnormal intra or extra-axial fluid collections.  There is no intracranial hemorrhage. Again noted is an 11 mm calcified  nodule at the superior margin of the right cerebellar hemisphere. This is  unchanged in size but has become more densely calcified 1 compared to the  prior study. No other mass or mass effect. Confluent low attenuation in the  periventricular distribution is consistent with chronic small vessel  ischemic disease. Ventricles and cortical sulci are within normal limits  for age.                               CT Cervical Spine (Final result)  Result time 02/05/19 11:45:19    Final result                 Impression:    IMPRESSION: No evidence of acute cervical spine abnormality.               Narrative:    EXAM: CT CERVICAL SPINE WO CONTRAST    CLINICAL HISTORY: C-SPINE TRAUMA, NEXUS/CCR POSITIVE,  LOW RISK    TECHNIQUE: Helical imaging through the cervical spine was performed with  axial, sagittal and coronal images evaluated.    COMPARISON: None.    FINDINGS: There is no fracture or subluxation. Vertebral body heights are  well-maintained. There is mild narrowing of the C5-C6 disc space. Remaining  disc spaces are preserved. Tiny osteophytes are present at C4, C5 and C6.  The C1-C2 articulation is symmetric. The odontoid is intact. Numerous skin  nodules are identified consistent with the patient's history of  neurofibromatosis. A small calcified mass is noted at the superior margin  of the right cerebellar hemisphere probably representing a meningioma.                                ED Medication Orders (From admission, onward)    Start Ordered     Status Ordering Provider    02/05/19 1220 02/05/19 1219    ONCE      Discontinued MICHELLE CHANG    02/05/19 1210 02/05/19 1209  diphtheria-pertussis (acellular)-tetanus (BOOSTRIX) vaccine 0.5 mL  ONCE      Last MAR action:  Given MARYAN TENA        ED Course  11:54 AM Rechecked pt. Pt is awake and resting, no acute distress. Updated that CT studies show no acute abnormality and removed C-collar. Performed laceration repair using skin glue of 1 cm right occipital head laceration. Pt tolerated procedure well. Pt will receive tetanus booster. She is agreeable.     12:15 PM Updated that remaining ED workup was unremarkable. Stressed the importance of f/u with PCP or return to ED if sx change or worsen. Pt understands and agrees with plan. All questions addressed.    [facility]  Critical Care time spent on this patient outside of billable procedures:  None    Clinical Impression  ED Diagnoses        Final diagnoses    Fall from standing, initial encounter          Contusion of scalp, initial encounter          Laceration of scalp, initial encounter          Cervical strain, acute, initial encounter                  Follow-up  Colby Freire MD  [address]  [place]  [address] [place] [id number]  [phone]    Call  As needed, If symptoms worsen          Summary of your Discharge Medications      You have not been prescribed any medications.         Pt is discharged in stable condition.    ______________________________________________________________________        I have reviewed the information recorded by the scribe for accuracy and agree with its contents.    ____________________________________________________________________  Rayray Ayala acting as scribe for Shauna Dodson DO.    Shauna Dodson DO  Dictation # [id number]  Scribe: Rayray Dodson DO  02/06/19 0931     4 = No assist / stand by assistance

## 2024-11-05 NOTE — DISCHARGE NOTE OB - ADDITIONAL INSTRUCTIONS
Follow up in 2 weeks for wound check  Please call office for sooner appointment if any concern for infection, bleeding or severe pain.

## 2024-11-05 NOTE — OB PROVIDER H&P - ASSESSMENT
33 y/o  @ 39.2 wks gestation presents for prolonged monitoring :  pt with a Category 2 FHT/ early labor   plan of care d/w dr Valdez / Dr Hui   pt to be admitted to L&D  39.2 wks gestation with a Category 2 FHT/ early labor for repeat    see admission orders

## 2024-11-05 NOTE — OB PROVIDER H&P - NSHPPHYSICALEXAM_GEN_ALL_CORE
abdomen: soft, nt on palp  T(C): 37.2 (11-05-24 @ 09:54), Max: 37.2 (11-05-24 @ 09:54)  HR: 84 (11-05-24 @ 10:23) (84 - 86)  BP: 109/65 (11-05-24 @ 10:23) (109/65 - 127/74)  RR: 15 (11-05-24 @ 09:54) (15 - 15)  SpO2: --  NST in progress  addendum @ 1120 :  FH baseline 140 FH variability mod +FH accels FH variable decel x's 1 @ 1110  toco: irregular uterine contractions   states her pain is 7/10 on pain scale and reports she had VE in [facility] and was 1 cm  repeat SVE: 0.5/80/-3  sono reports in AS[facility]  sono images in ASOB   TAS vtx anterior placenta MVP: 3.14 FH: 136 bpm

## 2024-11-05 NOTE — OB PROVIDER DELIVERY SUMMARY - NSSELHIDDEN_OBGYN_ALL_OB_FT
[NS_DeliveryAttending1_OBGYN_ALL_OB_FT:[id number]],[NS_DeliveryRN_OBGYN_ALL_OB_FT:[id number]]

## 2024-11-05 NOTE — OB PROVIDER TRIAGE NOTE - NSOBPROVIDERNOTE_OBGYN_ALL_OB_FT
35 y/o  @ 39.2 wks gestation presents for prolonged monitoring : 33 y/o  @ 39.2 wks gestation presents for prolonged monitoring :  pt with a Category 2 FHT/ early labor   plan of care d/w dr Valdez / Dr Hui   pt to be admitted to L&D  39.2 wks gestation with a Category 2 FHT/ early labor for repeat    see admission orders

## 2024-11-06 LAB
BASOPHILS # BLD AUTO: 0.01 K/UL — SIGNIFICANT CHANGE UP (ref 0–0.2)
BASOPHILS NFR BLD AUTO: 0.1 % — SIGNIFICANT CHANGE UP (ref 0–2)
EOSINOPHIL # BLD AUTO: 0.02 K/UL — SIGNIFICANT CHANGE UP (ref 0–0.5)
EOSINOPHIL NFR BLD AUTO: 0.3 % — SIGNIFICANT CHANGE UP (ref 0–6)
HCT VFR BLD CALC: 29.3 % — LOW (ref 34.5–45)
HGB BLD-MCNC: 9.9 G/DL — LOW (ref 11.5–15.5)
IANC: 4.91 K/UL — SIGNIFICANT CHANGE UP (ref 1.8–7.4)
IMM GRANULOCYTES NFR BLD AUTO: 0.6 % — SIGNIFICANT CHANGE UP (ref 0–0.9)
LYMPHOCYTES # BLD AUTO: 1.95 K/UL — SIGNIFICANT CHANGE UP (ref 1–3.3)
LYMPHOCYTES # BLD AUTO: 24.5 % — SIGNIFICANT CHANGE UP (ref 13–44)
MCHC RBC-ENTMCNC: 27 PG — SIGNIFICANT CHANGE UP (ref 27–34)
MCHC RBC-ENTMCNC: 33.8 G/DL — SIGNIFICANT CHANGE UP (ref 32–36)
MCV RBC AUTO: 79.8 FL — LOW (ref 80–100)
MONOCYTES # BLD AUTO: 1.01 K/UL — HIGH (ref 0–0.9)
MONOCYTES NFR BLD AUTO: 12.7 % — SIGNIFICANT CHANGE UP (ref 2–14)
NEUTROPHILS # BLD AUTO: 4.91 K/UL — SIGNIFICANT CHANGE UP (ref 1.8–7.4)
NEUTROPHILS NFR BLD AUTO: 61.8 % — SIGNIFICANT CHANGE UP (ref 43–77)
NRBC # BLD: 0 /100 WBCS — SIGNIFICANT CHANGE UP (ref 0–0)
NRBC # FLD: 0 K/UL — SIGNIFICANT CHANGE UP (ref 0–0)
PLATELET # BLD AUTO: 247 K/UL — SIGNIFICANT CHANGE UP (ref 150–400)
RBC # BLD: 3.67 M/UL — LOW (ref 3.8–5.2)
RBC # FLD: 15.4 % — HIGH (ref 10.3–14.5)
T PALLIDUM AB TITR SER: NEGATIVE — SIGNIFICANT CHANGE UP
WBC # BLD: 7.95 K/UL — SIGNIFICANT CHANGE UP (ref 3.8–10.5)
WBC # FLD AUTO: 7.95 K/UL — SIGNIFICANT CHANGE UP (ref 3.8–10.5)

## 2024-11-06 RX ORDER — PRENATAL VIT/IRON FUM/FOLIC AC 60 MG-1 MG
1 TABLET ORAL DAILY
Refills: 0 | Status: DISCONTINUED | OUTPATIENT
Start: 2024-11-06 | End: 2024-11-07

## 2024-11-06 RX ORDER — ASCORBIC ACID 500 MG
500 TABLET ORAL DAILY
Refills: 0 | Status: DISCONTINUED | OUTPATIENT
Start: 2024-11-06 | End: 2024-11-07

## 2024-11-06 RX ORDER — FERROUS SULFATE 325(65) MG
325 TABLET ORAL
Refills: 0 | Status: DISCONTINUED | OUTPATIENT
Start: 2024-11-06 | End: 2024-11-07

## 2024-11-06 RX ORDER — MEASLES,MUMPS,RUBELLA VACC/PF 12500/0.5
0.5 VIAL (EA) SUBCUTANEOUS ONCE
Refills: 0 | Status: COMPLETED | OUTPATIENT
Start: 2024-11-06 | End: 2024-11-07

## 2024-11-06 RX ORDER — IBUPROFEN 200 MG
600 TABLET ORAL EVERY 6 HOURS
Refills: 0 | Status: DISCONTINUED | OUTPATIENT
Start: 2024-11-06 | End: 2024-11-07

## 2024-11-06 RX ORDER — SENNA 187 MG
2 TABLET ORAL AT BEDTIME
Refills: 0 | Status: DISCONTINUED | OUTPATIENT
Start: 2024-11-06 | End: 2024-11-07

## 2024-11-06 RX ADMIN — SIMETHICONE 80 MILLIGRAM(S): 80 TABLET, CHEWABLE ORAL at 15:07

## 2024-11-06 RX ADMIN — Medication 975 MILLIGRAM(S): at 15:45

## 2024-11-06 RX ADMIN — Medication 600 MILLIGRAM(S): at 17:30

## 2024-11-06 RX ADMIN — Medication 975 MILLIGRAM(S): at 21:15

## 2024-11-06 RX ADMIN — KETOROLAC TROMETHAMINE 30 MILLIGRAM(S): 30 INJECTION INTRAMUSCULAR; INTRAVENOUS at 04:15

## 2024-11-06 RX ADMIN — HEPARIN SODIUM 5000 UNIT(S): 10000 INJECTION INTRAVENOUS; SUBCUTANEOUS at 08:28

## 2024-11-06 RX ADMIN — Medication 325 MILLIGRAM(S): at 06:00

## 2024-11-06 RX ADMIN — Medication 325 MILLIGRAM(S): at 18:35

## 2024-11-06 RX ADMIN — SODIUM CHLORIDE 3 MILLILITER(S): 9 INJECTION, SOLUTION INTRAMUSCULAR; INTRAVENOUS; SUBCUTANEOUS at 06:01

## 2024-11-06 RX ADMIN — MAGNESIUM HYDROXIDE 30 MILLILITER(S): 1200 SUSPENSION ORAL at 15:09

## 2024-11-06 RX ADMIN — Medication 2 TABLET(S): at 23:04

## 2024-11-06 RX ADMIN — Medication 975 MILLIGRAM(S): at 04:00

## 2024-11-06 RX ADMIN — Medication 975 MILLIGRAM(S): at 09:06

## 2024-11-06 RX ADMIN — Medication 975 MILLIGRAM(S): at 15:05

## 2024-11-06 RX ADMIN — Medication 975 MILLIGRAM(S): at 04:45

## 2024-11-06 RX ADMIN — Medication 975 MILLIGRAM(S): at 08:28

## 2024-11-06 RX ADMIN — Medication 600 MILLIGRAM(S): at 18:00

## 2024-11-06 RX ADMIN — Medication 600 MILLIGRAM(S): at 12:30

## 2024-11-06 RX ADMIN — HEPARIN SODIUM 5000 UNIT(S): 10000 INJECTION INTRAVENOUS; SUBCUTANEOUS at 20:45

## 2024-11-06 RX ADMIN — SIMETHICONE 80 MILLIGRAM(S): 80 TABLET, CHEWABLE ORAL at 08:27

## 2024-11-06 RX ADMIN — Medication 1 TABLET(S): at 11:43

## 2024-11-06 RX ADMIN — KETOROLAC TROMETHAMINE 30 MILLIGRAM(S): 30 INJECTION INTRAMUSCULAR; INTRAVENOUS at 04:00

## 2024-11-06 RX ADMIN — Medication 600 MILLIGRAM(S): at 11:43

## 2024-11-06 RX ADMIN — SODIUM CHLORIDE 3 MILLILITER(S): 9 INJECTION, SOLUTION INTRAMUSCULAR; INTRAVENOUS; SUBCUTANEOUS at 18:36

## 2024-11-06 RX ADMIN — Medication 975 MILLIGRAM(S): at 20:45

## 2024-11-06 NOTE — LACTATION INITIAL EVALUATION - INTERVENTION OUTCOME
Clinic Care Coordination Contact  [facility]/Voicemail       Clinical Data: Care Coordinator Outreach  Outreach attempted x 1.  Left message on daughter Ginna's voicemail with call back information and requested return call.    Patient has met with oncology and with her PCP to discuss her goals.     Plan:  Care Coordinator will try to reach patient again in 3-5 business days.  Magda Mccormick MARCOS  Clinic Care Coordinator  [phone]         Infant sleeping, Instructed in hand expression with + colostrum noted.  Informed pt about Triple feeding, Written Information given about triple Feeding. Primary Nurse to give pt breastPump. Mother and Infant roomed-in.   Mother educated on safe skin to skin care, safe sleep practices and environment. Call bell within reach./verbalizes understanding/demonstrates understanding of teaching

## 2024-11-06 NOTE — PROGRESS NOTE ADULT - SUBJECTIVE AND OBJECTIVE BOX
POST OP DAY  1  s/p   SECTION    SUBJECTIVE:    PAIN SCALE SCORE: [x] Refer to charted pain scores    THERAPY:  [  ] Spinal morphine   [x ] Epidural morphine   [  ] IV PCA Hydromorphone 1 mg/ml    OBJECTIVE:     SEDATION SCORE:	  [ x ] Alert	    [  ] Drowsy        [  ] Arousable	[  ] Asleep	[  ] Unresponsive    Side Effects:	  [ x ] None	     [  ] Nausea        [  ] Pruritus        [  ] Weakness   [  ] Numbness        ASSESSMENT/ PLAN   [   ] Discontinue         [  ] Continue    [ x ] Change to prn Analgesics as per primary service.    DOCUMENTATION & VERIFICATION OF CURRENT MEDS [ x ] Done    COMMENTS: No Headache.   POST OP DAY  1  s/p   SECTION    SUBJECTIVE: Doing ok    PAIN SCALE SCORE: [x] Refer to charted pain scores    THERAPY:  [  ] Spinal morphine   [x ] Epidural morphine   [  ] IV PCA Hydromorphone 1 mg/ml    OBJECTIVE:     SEDATION SCORE:	  [ x ] Alert	    [  ] Drowsy        [  ] Arousable	[  ] Asleep	[  ] Unresponsive    Side Effects:	  [ x ] None	     [  ] Nausea        [  ] Pruritus        [  ] Weakness   [  ] Numbness        ASSESSMENT/ PLAN   [   ] Discontinue         [  ] Continue    [ x ] Change to prn Analgesics as per primary service.    DOCUMENTATION & VERIFICATION OF CURRENT MEDS [ x ] Done    COMMENTS: No Headache.   POST OP DAY  1  s/p   SECTION    SUBJECTIVE: Doing ok    PAIN SCALE SCORE: [x] Refer to charted pain scores    THERAPY:  [  ] Spinal morphine   [x ] Epidural morphine   [  ] IV PCA Hydromorphone 1 mg/ml    OBJECTIVE:   Vital Signs Last 24 Hrs  T(C): 37 (2024 06:00), Max: 37.5 (2024 12:31)  T(F): 98.6 (2024 06:00), Max: 99.5 (2024 12:31)  HR: 73 (2024 06:00) (72 - 95)  BP: 107/69 (2024 06:00) (93/81 - 133/76)  BP(mean): 76 (2024 20:00) (76 - 113)  RR: 18 (2024 06:00) (12 - 19)  SpO2: 100% (2024 06:00) (97% - 100%)    Parameters below as of 2024 22:20  Patient On (Oxygen Delivery Method): room air        SEDATION SCORE:	  [ x ] Alert	    [  ] Drowsy        [  ] Arousable	[  ] Asleep	[  ] Unresponsive    Side Effects:	  [ x ] None	     [  ] Nausea        [  ] Pruritus        [  ] Weakness   [  ] Numbness        ASSESSMENT/ PLAN   [   ] Discontinue         [  ] Continue    [ x ] Change to prn Analgesics as per primary service.    DOCUMENTATION & VERIFICATION OF CURRENT MEDS [ x ] Done    COMMENTS: No Headache.

## 2024-11-06 NOTE — PROGRESS NOTE ADULT - ASSESSMENT
POD1 RCS with ROGER  -encouraged po intake and increased ambulation  -surgery findings reviewed as explanation of discomfort noted and expected  -may shower if pain controlled  -apply abd binder    acute blood loss anemia  -continue with po intake  -lochia described as light

## 2024-11-06 NOTE — PROGRESS NOTE ADULT - SUBJECTIVE AND OBJECTIVE BOX
INTERVAL HPI/OVERNIGHT EVENTS: Pt seen and examined at bedside.  Adequate PO pain control. no flatus. +voiding without difficulty, +ambulation, chaya reg diet.    MEDICATIONS  (STANDING):  acetaminophen     Tablet .. 975 milliGRAM(s) Oral <User Schedule>  ascorbic acid 500 milliGRAM(s) Oral daily  diphtheria/tetanus/pertussis (acellular) Vaccine (Adacel) 0.5 milliLiter(s) IntraMuscular once  ferrous    sulfate 325 milliGRAM(s) Oral two times a day  heparin   Injectable 5000 Unit(s) SubCutaneous every 12 hours  ibuprofen  Tablet. 600 milliGRAM(s) Oral every 6 hours  lactated ringers. 1000 milliLiter(s) (83 mL/Hr) IV Continuous <Continuous>  measles/mumps/rubella Vaccine 0.5 milliLiter(s) SubCutaneous once  prenatal multivitamin 1 Tablet(s) Oral daily  senna 2 Tablet(s) Oral at bedtime  sodium chloride 0.9% lock flush 3 milliLiter(s) IV Push every 8 hours    MEDICATIONS  (PRN):  diphenhydrAMINE 25 milliGRAM(s) Oral every 6 hours PRN Pruritus  lanolin Ointment 1 Application(s) Topical every 6 hours PRN Sore Nipples  magnesium hydroxide Suspension 30 milliLiter(s) Oral two times a day PRN Constipation  oxyCODONE    IR 5 milliGRAM(s) Oral once PRN Moderate to Severe Pain (4-10)  oxyCODONE    IR 5 milliGRAM(s) Oral every 3 hours PRN Moderate to Severe Pain (4-10)  simethicone 80 milliGRAM(s) Chew every 4 hours PRN Gas      Vital Signs Last 24 Hrs  T(C): 37 (06 Nov 2024 06:00), Max: 37.5 (05 Nov 2024 12:31)  T(F): 98.6 (06 Nov 2024 06:00), Max: 99.5 (05 Nov 2024 12:31)  HR: 73 (06 Nov 2024 06:00) (72 - 95)  BP: 107/69 (06 Nov 2024 06:00) (93/81 - 133/76)  BP(mean): 76 (05 Nov 2024 20:00) (76 - 113)  RR: 18 (06 Nov 2024 06:00) (12 - 19)  SpO2: 100% (06 Nov 2024 06:00) (97% - 100%)    Parameters below as of 05 Nov 2024 22:20  Patient On (Oxygen Delivery Method): room air        PHYSICAL EXAM:    GA: NAD, A+0 x 3  Abd: ( + ) BS, soft, tender with light palpation but  nondistended, no rebound or guarding,   Uterus: Fundus midline; firm  Incision: C/D/I    LABS:                        9.9    7.95  )-----------( 247      ( 06 Nov 2024 06:05 )             29.3               ABO Interpretation O  Rh Interpretation Positive  Antibody Screen Negative      RADIOLOGY & ADDITIONAL TESTS:

## 2024-11-06 NOTE — LACTATION INITIAL EVALUATION - LACTATION INTERVENTIONS
Mom educated about babies less than 24 hours of age will be sleepy. Made aware of cluster feeding that occurs after 24 hours of life and to be cautious of sleep deprivation in order to maintain infant and mother safety. Instructed to place infant in bassinet or call for assistance if feeling sleepy or tired.Recognition of feeding cues and to feed the baby on demand based on cues at least 8-12 times in a day. Instructed pt. to wake the baby to feed if no feeding cues are seen within 3h since prior feed. Pt. educated on the nutritional needs of the baby, how many wet and dirty diapers to expect, along with the amount of times the baby needs to be placed on the breast at this time.  use  feeding log to record feedings along with wet and dirty diapers. instructed in hand expression with good return demonstration.  Reviewed safe skin to skin. Verbalized understanding of education. Encouraged to breastfeed the baby on demand based on cues and at least 8-12 times in a day. Instructed to log feedings along with wet and dirty diapers. Recommended more breastfeeding and less formula feeding. Supplementation risks discussed. Strategies reviewed on getting baby on breast more often./initiate/review safe skin-to-skin/initiate/review hand expression/initiate/review techniques for position and latch/post discharge community resources provided/initiate/review supplementation plan due to medical indications/review techniques to increase milk supply/review techniques to manage sore nipples/engorgement/initiate/review finger suck/initiate/review breast massage/compression/initiate/review alternate feeding method/reviewed components of an effective feeding and at least 8 effective feedings per day required/reviewed importance of monitoring infant diapers, the breastfeeding log, and minimum output each day/reviewed risks of unnecessary formula supplementation/reviewed risks of artificial nipples/reviewed strategies to transition to breastfeeding only/reviewed benefits and recommendations for rooming in/reviewed feeding on demand/by cue at least 8 times a day/recommended follow-up with pediatrician within 24 hours of discharge/reviewed indications of inadequate milk transfer that would require supplementation

## 2024-11-07 VITALS
OXYGEN SATURATION: 100 % | RESPIRATION RATE: 16 BRPM | DIASTOLIC BLOOD PRESSURE: 72 MMHG | HEART RATE: 84 BPM | TEMPERATURE: 97 F | SYSTOLIC BLOOD PRESSURE: 110 MMHG

## 2024-11-07 RX ORDER — ACETAMINOPHEN 500 MG
3 TABLET ORAL
Qty: 0 | Refills: 0 | DISCHARGE
Start: 2024-11-07

## 2024-11-07 RX ORDER — ASCORBIC ACID 500 MG
1 TABLET ORAL
Qty: 0 | Refills: 0 | DISCHARGE
Start: 2024-11-07

## 2024-11-07 RX ADMIN — Medication 0.5 MILLILITER(S): at 11:20

## 2024-11-07 RX ADMIN — SIMETHICONE 80 MILLIGRAM(S): 80 TABLET, CHEWABLE ORAL at 08:32

## 2024-11-07 RX ADMIN — HEPARIN SODIUM 5000 UNIT(S): 10000 INJECTION INTRAVENOUS; SUBCUTANEOUS at 08:38

## 2024-11-07 RX ADMIN — Medication 975 MILLIGRAM(S): at 03:15

## 2024-11-07 RX ADMIN — Medication 600 MILLIGRAM(S): at 11:22

## 2024-11-07 RX ADMIN — Medication 600 MILLIGRAM(S): at 12:15

## 2024-11-07 RX ADMIN — Medication 1 TABLET(S): at 11:22

## 2024-11-07 RX ADMIN — Medication 325 MILLIGRAM(S): at 08:32

## 2024-11-07 RX ADMIN — Medication 975 MILLIGRAM(S): at 09:15

## 2024-11-07 RX ADMIN — Medication 500 MILLIGRAM(S): at 11:22

## 2024-11-07 RX ADMIN — Medication 975 MILLIGRAM(S): at 08:31

## 2024-11-07 RX ADMIN — Medication 975 MILLIGRAM(S): at 04:00

## 2024-11-07 NOTE — PROGRESS NOTE ADULT - SUBJECTIVE AND OBJECTIVE BOX
Post-Operative Note, C/S  She is a  34y woman who is now post-operative day: 2    Subjective:  The patient feels well.  She is ambulating.   She is tolerating regular diet.  She denies nausea and vomiting; denies fever.  She is voiding.  Her pain is controlled; incisional pain is appropriate.  She reports normal postpartum bleeding.  She is breastfeeding.  She is formula feeding.    Physical exam:    Vital Signs Last 24 Hrs  T(C): 37.1 (07 Nov 2024 06:00), Max: 37.1 (07 Nov 2024 06:00)  T(F): 98.7 (07 Nov 2024 06:00), Max: 98.7 (07 Nov 2024 06:00)  HR: 98 (07 Nov 2024 06:00) (61 - 98)  BP: 104/66 (07 Nov 2024 06:00) (104/66 - 134/83)  BP(mean): --  RR: 16 (07 Nov 2024 06:00) (16 - 18)  SpO2: 100% (07 Nov 2024 06:00) (99% - 100%)    Parameters below as of 06 Nov 2024 22:14  Patient On (Oxygen Delivery Method): room air        Gen: NAD  Breast: Soft, nontender, not engorged.  Abdomen: Soft, nontender, no distension , firm uterine fundus at umbilicus.  Incision: C/D/I.  Pelvic: Normal lochia noted  Ext: No calf tenderness    LABS:                        9.9    7.95  )-----------( 247      ( 06 Nov 2024 06:05 )             29.3       Rubella status:     Allergies    No Known Allergies    Intolerances      MEDICATIONS  (STANDING):  acetaminophen     Tablet .. 975 milliGRAM(s) Oral <User Schedule>  ascorbic acid 500 milliGRAM(s) Oral daily  diphtheria/tetanus/pertussis (acellular) Vaccine (Adacel) 0.5 milliLiter(s) IntraMuscular once  ferrous    sulfate 325 milliGRAM(s) Oral two times a day  heparin   Injectable 5000 Unit(s) SubCutaneous every 12 hours  ibuprofen  Tablet. 600 milliGRAM(s) Oral every 6 hours  prenatal multivitamin 1 Tablet(s) Oral daily  senna 2 Tablet(s) Oral at bedtime  sodium chloride 0.9% lock flush 3 milliLiter(s) IV Push every 8 hours    MEDICATIONS  (PRN):  diphenhydrAMINE 25 milliGRAM(s) Oral every 6 hours PRN Pruritus  lanolin Ointment 1 Application(s) Topical every 6 hours PRN Sore Nipples  magnesium hydroxide Suspension 30 milliLiter(s) Oral two times a day PRN Constipation  oxyCODONE    IR 5 milliGRAM(s) Oral once PRN Moderate to Severe Pain (4-10)  oxyCODONE    IR 5 milliGRAM(s) Oral every 3 hours PRN Moderate to Severe Pain (4-10)  simethicone 80 milliGRAM(s) Chew every 4 hours PRN Gas

## 2024-11-07 NOTE — PROGRESS NOTE ADULT - ASSESSMENT
POD #2, s/p VA-R/C/S LTCS, QBL 441ml. Anemia TIEN. Doing well post-operatively.     Anemia TIEN#  -H/H 9.9/29.3  -continue taking PO Iron, patient has at home, no Rx needed to be sent  -encourage iron rich foods  -asymptomatic    Her pain is well controlled.   She is tolerating a regular diet and passing flatus.   Denies N/V. Denies CP/SOB/lightheadedness/dizziness.   She is ambulating without difficulty.   Voiding spontaneously.    Patient is stable and doing well post-operatively.    - Continue regular diet.  - Increase ambulation.  - Continue motrin, tylenol, oxycodone PRN for pain control.   -Encourage breastfeeding.  -Encourage abdominal binder use  -encourage incentive spirometer use  -Incisional care and PO instructions reviewed.     Follow up @ [facility] in 2 weeks for incision check visit  [phone]    Discussed with MD Aki Oliver     POD #2, s/p VA-R/C/S LTCS, QBL 441ml. Anemia TIEN. Doing well post-operatively.     Anemia TIEN#  -H/H 9.9/29.3  -continue taking PO Iron, patient has at home, no Rx needed to be sent  -encourage iron rich foods  -asymptomatic    Her pain is well controlled.   She is tolerating a regular diet and passing flatus.   Denies N/V. Denies CP/SOB/lightheadedness/dizziness.   She is ambulating without difficulty.   Voiding spontaneously.    Patient is stable and doing well post-operatively.    - Continue regular diet.  - Increase ambulation.  - Continue motrin, tylenol, oxycodone PRN for pain control.   -Encourage breastfeeding.  -Encourage abdominal binder use  -encourage incentive spirometer use  -Incisional care and PO instructions reviewed.     Follow up @ [facility] in 2 weeks for incision check visit  [phone]    Discussed with MD Aki ArredondoBest    POD #2, s/p VA-R LTCS, QBL 441ml. Anemia w/o complaints. Doing well post-operatively.   Anticipate d/c 11/8  David Prabhakar M.D.

## 2024-11-19 PROBLEM — D64.9 ANEMIA, UNSPECIFIED: Chronic | Status: ACTIVE | Noted: 2024-10-30

## 2025-06-16 NOTE — ED ADULT NURSE NOTE - SKIN INTEGRITY
Patient asking for new humira RX.  Humira is listed as historical on the med list.   Is patient using syringe or prefill pen?    Elisha Urbano RN           intact

## (undated) DEVICE — DRSG DERMABOND PRINEO 22CM